# Patient Record
Sex: MALE | Race: WHITE | NOT HISPANIC OR LATINO
[De-identification: names, ages, dates, MRNs, and addresses within clinical notes are randomized per-mention and may not be internally consistent; named-entity substitution may affect disease eponyms.]

---

## 2018-06-01 PROBLEM — Z00.00 ENCOUNTER FOR PREVENTIVE HEALTH EXAMINATION: Status: ACTIVE | Noted: 2018-06-01

## 2018-06-04 ENCOUNTER — APPOINTMENT (OUTPATIENT)
Dept: ORTHOPEDIC SURGERY | Facility: CLINIC | Age: 38
End: 2018-06-04
Payer: COMMERCIAL

## 2018-06-04 VITALS — WEIGHT: 170 LBS | RESPIRATION RATE: 16 BRPM | BODY MASS INDEX: 24.34 KG/M2 | HEIGHT: 70 IN

## 2018-06-04 DIAGNOSIS — M24.571 CONTRACTURE, RIGHT ANKLE: ICD-10-CM

## 2018-06-04 DIAGNOSIS — M76.71 PERONEAL TENDINITIS, RIGHT LEG: ICD-10-CM

## 2018-06-04 DIAGNOSIS — Z82.61 FAMILY HISTORY OF ARTHRITIS: ICD-10-CM

## 2018-06-04 DIAGNOSIS — Z80.9 FAMILY HISTORY OF MALIGNANT NEOPLASM, UNSPECIFIED: ICD-10-CM

## 2018-06-04 PROCEDURE — 99203 OFFICE O/P NEW LOW 30 MIN: CPT

## 2018-06-04 RX ORDER — CETIRIZINE HCL 10 MG
TABLET ORAL
Refills: 0 | Status: ACTIVE | COMMUNITY

## 2018-06-04 RX ORDER — SERTRALINE HYDROCHLORIDE 25 MG/1
TABLET, FILM COATED ORAL
Refills: 0 | Status: ACTIVE | COMMUNITY

## 2018-06-04 RX ORDER — FINASTERIDE 1 MG/1
1 TABLET, FILM COATED ORAL
Refills: 0 | Status: ACTIVE | COMMUNITY

## 2018-06-27 ENCOUNTER — APPOINTMENT (OUTPATIENT)
Dept: ORTHOPEDIC SURGERY | Facility: CLINIC | Age: 38
End: 2018-06-27

## 2021-06-03 ENCOUNTER — APPOINTMENT (OUTPATIENT)
Dept: ORTHOPEDIC SURGERY | Facility: CLINIC | Age: 41
End: 2021-06-03
Payer: COMMERCIAL

## 2021-06-03 VITALS — WEIGHT: 175 LBS | BODY MASS INDEX: 25.05 KG/M2 | HEIGHT: 70 IN

## 2021-06-03 DIAGNOSIS — M77.00 MEDIAL EPICONDYLITIS, UNSPECIFIED ELBOW: ICD-10-CM

## 2021-06-03 DIAGNOSIS — M25.521 PAIN IN RIGHT ELBOW: ICD-10-CM

## 2021-06-03 PROCEDURE — 99213 OFFICE O/P EST LOW 20 MIN: CPT

## 2021-06-03 PROCEDURE — 99072 ADDL SUPL MATRL&STAF TM PHE: CPT

## 2021-06-03 PROCEDURE — 73070 X-RAY EXAM OF ELBOW: CPT | Mod: RT

## 2021-06-04 ENCOUNTER — APPOINTMENT (OUTPATIENT)
Dept: ORTHOPEDIC SURGERY | Facility: CLINIC | Age: 41
End: 2021-06-04

## 2021-06-08 PROBLEM — M77.00 MEDIAL EPICONDYLITIS: Status: ACTIVE | Noted: 2021-06-08

## 2021-06-08 NOTE — PHYSICAL EXAM
[de-identified] : Right elbow\par Constitutional: \par The patient is healthy-appearing and in no apparent distress. \par \par Cardiovascular System: \par There is capillary refill less than 2 seconds. \par \par Skin: \par There is no skin abnormalities of elbow pther than a healed medial longitudinal scar ( 7 cm ) medially.\par \par Right Elbow: \par Appearance: \par There is no deformity, induration, redness, swelling, or warmth and a normal carrying angle. \par \par Bony Palpation: \par There is tenderness of the medial epicondyle.\par There is no tenderness of olecranon.\par There is no tenderness of the ulnatrochlea articulation.\par There is no tenderness of the coronoid process.\par There is no tenderness of the radial head.\par There is no tenderness of the radiocapitellar joint.\par There is no tenderness of the lateral epicondyle. \par \par Soft Tissue Palpation: \par There is no tenderness of the ulnar nerve.\par There is no tenderness of the biceps insertion.\par There is no tenderness of the pronator teres.\par There is tenderness of the flexor carpi ulnaris.\par There is no tenderness of the flexor carpi radialis.\par There is no tenderness of the annular ligament of the radius.\par There is no tenderness of the brachioradialis.\par There is no tenderness of the radial collateral ligament.\par There is no tenderness of the ulnar collateral ligament.\par There is no tenderness of the antecubital fossa.\par There is no tenderness of the extensor carpi radialis brevis.\par There is no tenderness of the extensor carpi radialis longus.\par \par Range of Motion:  \par There is full range of motion both actively and passively. \par \par Stability:\par There is no dislocation or laxity to testing.\par  \par Strength: \par There is 5/5 elbow flexion, extension, supination and pronation.  \par \par Neurologic:\par There is normal sensation C5-T1 to light touch. \par \par Psychiatric: \par The patient demonstrates a normal mood and affect and is active and alert. [de-identified] : X-ray right elbow.  There is no fracture or arthritis.  There appears to be a prior mild partial medial epicondylectomy with chronic appearing calcifications.\par \par

## 2021-06-08 NOTE — HISTORY OF PRESENT ILLNESS
[de-identified] : Location: Right elbow (medial)\par Duration: 1 month\par Context: atraumatic\par Quality: dull\par Aggravating factors: swimming, direct pressure on elbow, laying on right arm\par Associated symptoms: radiating pain into pinky and ring fingers\par Conservative treatment: ice, rest\par Prior studies: N/A\par History of right medial epicondylectomy with ulnar nerve transposition 15 years ago

## 2021-06-08 NOTE — ASSESSMENT
[FreeTextEntry1] : Discussed at length with patient exam history recorded history as well as imaging and treatment options.  Patient this time elects home exercises it was counterforce bracing.  Discussed concern over the prior reported ulnar nerve transposition however there is a small incision that he may be having some recurrent cubital tunnel such ulnar nerve symptoms.  Discussed is why would refrain from any type of cortisone injection given the ulnar nerve transposition and patient agrees.  At this time I would recommend home exercises and brace wear and if no improvement consideration to further imaging as well as EMG